# Patient Record
Sex: FEMALE | Race: WHITE | Employment: FULL TIME | URBAN - METROPOLITAN AREA
[De-identification: names, ages, dates, MRNs, and addresses within clinical notes are randomized per-mention and may not be internally consistent; named-entity substitution may affect disease eponyms.]

---

## 2018-03-24 ENCOUNTER — APPOINTMENT (OUTPATIENT)
Dept: GENERAL RADIOLOGY | Facility: CLINIC | Age: 23
End: 2018-03-24
Attending: EMERGENCY MEDICINE
Payer: COMMERCIAL

## 2018-03-24 ENCOUNTER — HOSPITAL ENCOUNTER (EMERGENCY)
Facility: CLINIC | Age: 23
Discharge: HOME | End: 2018-03-24
Attending: EMERGENCY MEDICINE | Admitting: EMERGENCY MEDICINE
Payer: COMMERCIAL

## 2018-03-24 VITALS
WEIGHT: 140 LBS | BODY MASS INDEX: 20.73 KG/M2 | HEIGHT: 69 IN | OXYGEN SATURATION: 97 % | DIASTOLIC BLOOD PRESSURE: 88 MMHG | RESPIRATION RATE: 27 BRPM | HEART RATE: 98 BPM | SYSTOLIC BLOOD PRESSURE: 139 MMHG

## 2018-03-24 DIAGNOSIS — T78.40XA ALLERGIC REACTION, INITIAL ENCOUNTER: ICD-10-CM

## 2018-03-24 DIAGNOSIS — R07.89 ATYPICAL CHEST PAIN: ICD-10-CM

## 2018-03-24 DIAGNOSIS — R06.02 SHORTNESS OF BREATH: ICD-10-CM

## 2018-03-24 LAB
ANION GAP SERPL CALCULATED.3IONS-SCNC: 9 MMOL/L (ref 3–14)
BASOPHILS # BLD AUTO: 0.1 10E9/L (ref 0–0.2)
BASOPHILS NFR BLD AUTO: 0.6 %
BUN SERPL-MCNC: 9 MG/DL (ref 7–30)
CALCIUM SERPL-MCNC: 9.3 MG/DL (ref 8.5–10.1)
CHLORIDE SERPL-SCNC: 103 MMOL/L (ref 94–109)
CO2 SERPL-SCNC: 24 MMOL/L (ref 20–32)
CREAT SERPL-MCNC: 0.64 MG/DL (ref 0.52–1.04)
DIFFERENTIAL METHOD BLD: NORMAL
EOSINOPHIL # BLD AUTO: 0.5 10E9/L (ref 0–0.7)
EOSINOPHIL NFR BLD AUTO: 5.3 %
ERYTHROCYTE [DISTWIDTH] IN BLOOD BY AUTOMATED COUNT: 12 % (ref 10–15)
GFR SERPL CREATININE-BSD FRML MDRD: >90 ML/MIN/1.7M2
GLUCOSE SERPL-MCNC: 94 MG/DL (ref 70–99)
HCT VFR BLD AUTO: 42.4 % (ref 35–47)
HGB BLD-MCNC: 14.7 G/DL (ref 11.7–15.7)
IMM GRANULOCYTES # BLD: 0 10E9/L (ref 0–0.4)
IMM GRANULOCYTES NFR BLD: 0.1 %
LYMPHOCYTES # BLD AUTO: 4.6 10E9/L (ref 0.8–5.3)
LYMPHOCYTES NFR BLD AUTO: 51.7 %
MCH RBC QN AUTO: 28.3 PG (ref 26.5–33)
MCHC RBC AUTO-ENTMCNC: 34.7 G/DL (ref 31.5–36.5)
MCV RBC AUTO: 82 FL (ref 78–100)
MONOCYTES # BLD AUTO: 0.6 10E9/L (ref 0–1.3)
MONOCYTES NFR BLD AUTO: 7.1 %
NEUTROPHILS # BLD AUTO: 3.2 10E9/L (ref 1.6–8.3)
NEUTROPHILS NFR BLD AUTO: 35.2 %
NRBC # BLD AUTO: 0 10*3/UL
NRBC BLD AUTO-RTO: 0 /100
PLATELET # BLD AUTO: 241 10E9/L (ref 150–450)
POTASSIUM SERPL-SCNC: 3.8 MMOL/L (ref 3.4–5.3)
RBC # BLD AUTO: 5.19 10E12/L (ref 3.8–5.2)
SODIUM SERPL-SCNC: 136 MMOL/L (ref 133–144)
TROPONIN I BLD-MCNC: 0 UG/L (ref 0–0.1)
WBC # BLD AUTO: 8.9 10E9/L (ref 4–11)

## 2018-03-24 PROCEDURE — 96361 HYDRATE IV INFUSION ADD-ON: CPT

## 2018-03-24 PROCEDURE — 84484 ASSAY OF TROPONIN QUANT: CPT

## 2018-03-24 PROCEDURE — 99284 EMERGENCY DEPT VISIT MOD MDM: CPT | Mod: 25

## 2018-03-24 PROCEDURE — 25000128 H RX IP 250 OP 636: Performed by: EMERGENCY MEDICINE

## 2018-03-24 PROCEDURE — 96374 THER/PROPH/DIAG INJ IV PUSH: CPT

## 2018-03-24 PROCEDURE — 25000132 ZZH RX MED GY IP 250 OP 250 PS 637: Performed by: EMERGENCY MEDICINE

## 2018-03-24 PROCEDURE — 80048 BASIC METABOLIC PNL TOTAL CA: CPT | Performed by: EMERGENCY MEDICINE

## 2018-03-24 PROCEDURE — 85025 COMPLETE CBC W/AUTO DIFF WBC: CPT | Performed by: EMERGENCY MEDICINE

## 2018-03-24 PROCEDURE — 71046 X-RAY EXAM CHEST 2 VIEWS: CPT

## 2018-03-24 RX ORDER — LIDOCAINE 40 MG/G
CREAM TOPICAL
Status: DISCONTINUED | OUTPATIENT
Start: 2018-03-24 | End: 2018-03-25 | Stop reason: HOSPADM

## 2018-03-24 RX ORDER — METHYLPREDNISOLONE SODIUM SUCCINATE 125 MG/2ML
125 INJECTION, POWDER, LYOPHILIZED, FOR SOLUTION INTRAMUSCULAR; INTRAVENOUS ONCE
Status: COMPLETED | OUTPATIENT
Start: 2018-03-24 | End: 2018-03-24

## 2018-03-24 RX ORDER — ALBUTEROL SULFATE 0.83 MG/ML
2.5 SOLUTION RESPIRATORY (INHALATION)
Status: DISCONTINUED | OUTPATIENT
Start: 2018-03-24 | End: 2018-03-25 | Stop reason: HOSPADM

## 2018-03-24 RX ORDER — PREDNISONE 20 MG/1
TABLET ORAL
Qty: 10 TABLET | Refills: 0 | Status: SHIPPED | OUTPATIENT
Start: 2018-03-24 | End: 2020-06-15

## 2018-03-24 RX ADMIN — RANITIDINE 150 MG: 150 TABLET ORAL at 19:21

## 2018-03-24 RX ADMIN — SODIUM CHLORIDE 1000 ML: 9 INJECTION, SOLUTION INTRAVENOUS at 19:23

## 2018-03-24 RX ADMIN — METHYLPREDNISOLONE SODIUM SUCCINATE 125 MG: 125 INJECTION, POWDER, FOR SOLUTION INTRAMUSCULAR; INTRAVENOUS at 19:22

## 2018-03-24 ASSESSMENT — ENCOUNTER SYMPTOMS
WHEEZING: 0
DIARRHEA: 0
VOICE CHANGE: 0
CHEST TIGHTNESS: 1
SORE THROAT: 0
VOMITING: 0
TROUBLE SWALLOWING: 0
COUGH: 0
NAUSEA: 0
SHORTNESS OF BREATH: 1

## 2018-03-24 NOTE — ED PROVIDER NOTES
"  History     Chief Complaint:  Allergic reaction    HPI   Jasvir Cueva is a 22 year old female with a history of asthma who presents to the ED for evaluation of an allergic reaction. The patient reports that she has had many allergic reactions in the past and is allergic to many things such as soy and anything from a seed, bean, or nut. The patient states that she ate a banana 1.5 hours ago and experienced immediate chest tightness and shortness of breath. She then took 4 benadryl 15 minutes after eating the banana which gave no relief. She denies any lip or tongue swelling, trouble swallowing, or rash.    Allergies:  Banana  Sulfa drugs  Soy     Medications:    Desogestrel-Ethinyl Estradiol  Albuterol inhaler    Past Medical History:    Uncomplicated asthma    Past Surgical History:    History reviewed. No pertinent surgical history.    Family History:    No other significant past medical history or family history.     Social History:  Smoking status: Never  Alcohol use: No  Marital Status: Single      Review of Systems   HENT: Negative for sore throat, trouble swallowing and voice change.    Respiratory: Positive for chest tightness and shortness of breath. Negative for cough and wheezing.    Cardiovascular: Negative for chest pain.   Gastrointestinal: Negative for diarrhea, nausea and vomiting.   Skin: Negative for rash.   All other systems reviewed and are negative.    Physical Exam   Patient Vitals for the past 24 hrs:   BP Pulse Heart Rate Resp SpO2 Height Weight   03/24/18 2015 - - 66 20 100 % - -   03/24/18 1858 139/88 98 98 26 98 % 1.753 m (5' 9\") 63.5 kg (140 lb)     Physical Exam  General: The patient is alert, in no respiratory distress.    HENT: Mucous membranes moist.    Cardiovascular: Regular rate and rhythm. Good pulses in all four extremities. Normal capillary refill and skin turgor.     Respiratory: Lungs are clear. No nasal flaring. No retractions. No wheezing, no " crackles.    Gastrointestinal: Abdomen soft. No guarding, no rebound. No palpable hernias.     Musculoskeletal: No gross deformity.     Skin: No rashes or petechiae.     Neurologic: The patient is alert and oriented x3. GCS 15. No testable cranial nerve deficit. Follows commands with clear and appropriate speech. Gives appropriate answers. Good strength in all extremities. No gross neurologic deficit. Gross sensation intact. Pupils are round and reactive. No meningismus.     Lymphatic: No cervical adenopathy. No lower extremity swelling.    Psychiatric: The patient is non-tearful.    Emergency Department Course     Imaging:  Radiographic findings were communicated with the patient who voiced understanding of the findings.    X-ray Chest, 2 views:  IMPRESSION: Hyperaeration. No other evidence of acute cardiopulmonary  disease is seen.  Result per radiology.     Laboratory:  1941 - Troponin: 0.00  CBC: WNL (WBC 8.9, HGB 14.7, )   BMP: WNL (Creatinine 0.64)    Interventions:  1921: Zantac 150 mg tablet PO  1922: Solu-medrol 125 mg IV injection  1923: NS 1L IV Bolus    Emergency Department Course:  Past medical records, nursing notes, and vitals reviewed.  1854: I performed an exam of the patient and obtained history, as documented above. GCS 15.    IV inserted and blood drawn.    The patient was sent for a x-ray while in the emergency department, findings above.    2019: I rechecked the patient. Explained findings to patient.    2148: I rechecked the patient. Findings and plan explained to the Patient. Patient discharged home with instructions regarding supportive care, medications, and reasons to return. The importance of close follow-up was reviewed.     Impression & Plan      Medical Decision Making:  The patient reports she has multiple food allergies and has had bananas previously, however, it is possible that something else caused this allergic reaction. I also considered with her chest tightness and chest  pain this could be ACS but that is very unlikely. With her screening EKG and chest x-ray it is not likely to be pneumonia or pneumothorax. The patient responded nicely to steroids. I did discuss giving her epinephrine here, however, she had already had a large dose of benadryl. She was treated with H2 blocker, steroid, observed here and improved. She was discharged home in improved pulmonary function, no signs of chest pain, and will follow up with her PCP. Steroids were started.    Diagnosis:    ICD-10-CM   1. Shortness of breath R06.02   2. Atypical chest pain R07.89   3. Allergic reaction, initial encounter T78.40XA       Disposition:  discharged to home    Discharge Medications:  New Prescriptions    PREDNISONE (DELTASONE) 20 MG TABLET    Take two tablets (= 40mg) each day for 5 (five) days         Ava Conrad  3/24/2018   [facility] EMERGENCY DEPARTMENT  I, Ava Conrad, am serving as a scribe at 6:54 PM on 3/24/2018 to document services personally performed by Rogelio Griffin MD based on my observations and the provider's statements to me.        Rogelio Griffin MD  03/24/18 8510

## 2018-03-24 NOTE — ED AVS SNAPSHOT
[facility] Emergency Department    [address]    [facility] [address]    Phone:  [phone]    Fax:  [phone]                                       Jasvir Cueva   MRN: [id number]    Department:  [facility] Emergency Department   Date of Visit:  3/24/2018           Patient Information     Date Of Birth          1995        Your diagnoses for this visit were:     Shortness of breath     Atypical chest pain     Allergic reaction, initial encounter        You were seen by Rogelio Griffin MD.      Follow-up Information     Follow up with Liz Carrera Schedule an appointment as soon as possible for a visit in 3 days.    Specialty:  Physician Assistant    Contact information:    [facility]  [address] [facility] [address]  [phone]          Discharge Instructions       Discharge Instructions  Allergic Reaction    An allergic reaction can result in a rash, itching, swelling, watery eyes, or a runny nose. A serious reaction can cause swelling of your mouth or throat, or wheezing. The most serious allergy is called analphylaxis, and can be life-threatening. Many allergies result in hives, also called urticaria.     An allergy happens when the body s natural defense system (immune system) overreacts to something harmless. The thing that triggers your allergic reaction is called an allergen. The first time you are exposed to your allergen, you may not have any reaction, but the body makes a protein called an antibody. The antibody lets the body recognize and remember the allergen.  Every time you are exposed to your allergen you get more antibody and your reaction can be more severe.      Call 911 if you have:    Swelling of the lips, tongue or throat.    Hoarse voice, drooling or trouble breathing.    Chest pain or shortness of breath.    Fainting or unconsciousness.    Return to the Emergency Department if:    You develop a  fever.    You have significant abdominal pain.    You vomit more than once.    Your rash changes or looks very different.    What can I do to help myself?    If you know what caused your allergy, don t touch it, throw any of it away, and tell others not to have it around you. Wear a medical alert bracelet with a name of your allergen on it.    If you don t know what you are allergic to, keep a journal of everything that you are exposed to (foods, soaps, medicines, etc.). Take this with you when you follow up with your primary doctor. This may help determine what is causing the allergic reaction.    Take any medicines that are prescribed.    Antihistamines can decrease rash or itching. You may use Benadryl  (diphenhydramine) for rash or itching according to package directions, or use a prescription antihistamine as recommended by your physician.    For significant allergic reactions, you may have been given an epinephrine (adrenaline) auto injector (EpiPen ). Carry this with you at all times! Use it if you are having any symptoms of anaphylaxis.  Do not be afraid to use it. Always call 911 if you use your EpiPen ! It is only meant to buy time until you can get to the Emergency Department!  If you were given a prescription for medicine here today, be sure to read all of the information (including the package insert) that comes with your prescription.  This will include important information about the medicine, its side effects, and any warnings that you need to know about.  The pharmacist who fills the prescription can provide more information and answer questions you may have about the medicine.  If you have questions or concerns that the pharmacist cannot address, please call or return to the Emergency Department.     Opioid Medication Information    Pain medications are among the most commonly prescribed medicines, so we are including this information for all our patients. If you did not receive pain medication or  get a prescription for pain medicine, you can ignore it.     You may have been given a prescription for an opioid (narcotic) pain medicine and/or have received a pain medicine while here in the Emergency Department. These medicines can make you drowsy or impaired. You must not drive, operate dangerous equipment, or engage in any other dangerous activities while taking these medications. If you drive while taking these medications, you could be arrested for DUI, or driving under the influence. Do not drink any alcohol while you are taking these medications.     Opioid pain medications can cause addiction. If you have a history of chemical dependency of any type, you are at a higher risk of becoming addicted to pain medications.  Only take these prescribed medications to treat your pain when all other options have been tried. Take it for as short a time and as few doses as possible. Store your pain pills in a secure place, as they are frequently stolen and provide a dangerous opportunity for children or visitors in your house to start abusing these powerful medications. We will not replace any lost or stolen medicine.  As soon as your pain is better, you should flush all your remaining medication.     Many prescription pain medications contain Tylenol  (acetaminophen), including Vicodin , Tylenol #3 , Norco , Lortab , and Percocet .  You should not take any extra pills of Tylenol  if you are using these prescription medications or you can get very sick.  Do not ever take more than 3000 mg of acetaminophen in any 24 hour period.    All opioids tend to cause constipation. Drink plenty of water and eat foods that have a lot of fiber, such as fruits, vegetables, prune juice, apple juice and high fiber cereal.  Take a laxative if you don t move your bowels at least every other day. Miralax , Milk of Magnesia, Colace , or Senna  can be used to keep you regular.      Remember that you can always come back to the Emergency  Department if you are not able to see your regular doctor in the amount of time listed above, if you get any new symptoms, or if there is anything that worries you.        24 Hour Appointment Hotline       To make an appointment at any [facility], call [phone] ([phone]). If you don't have a family doctor or clinic, we will help you find one. [place] clinics are conveniently located to serve the needs of you and your family.             Review of your medicines      START taking        Dose / Directions Last dose taken    predniSONE 20 MG tablet   Commonly known as:  DELTASONE   Quantity:  10 tablet        Take two tablets (= 40mg) each day for 5 (five) days   Refills:  0          Our records show that you are taking the medicines listed below. If these are incorrect, please call your family doctor or clinic.        Dose / Directions Last dose taken    * albuterol 108 (90 BASE) MCG/ACT Inhaler   Commonly known as:  PROAIR HFA/PROVENTIL HFA/VENTOLIN HFA        Inhale into the lungs every 6 hours   Refills:  0        * albuterol 108 (90 BASE) MCG/ACT Inhaler   Commonly known as:  PROAIR HFA   Dose:  2 puff   Quantity:  1 Inhaler        Inhale 2 puffs into the lungs every 4 hours as needed for shortness of breath / dyspnea   Refills:  0        desogestrel-ethinyl estradiol 0.1/0.125/0.15 -0.025 MG per tablet   Commonly known as:  SHELIA, VELIVET        Refills:  0        * Notice:  This list has 2 medication(s) that are the same as other medications prescribed for you. Read the directions carefully, and ask your doctor or other care provider to review them with you.            Prescriptions were sent or printed at these locations (1 Prescription)                   Other Prescriptions                Printed at Department/Unit printer (1 of 1)         predniSONE (DELTASONE) 20 MG tablet                Procedures and tests performed during your visit     Basic metabolic panel (BMP)    CBC + differential     Troponin POCT    XR Chest 2 Views      Orders Needing Specimen Collection     None      Pending Results     No orders found from 3/22/2018 to 3/25/2018.            Pending Culture Results     No orders found from 3/22/2018 to 3/25/2018.            Pending Results Instructions     If you had any lab results that were not finalized at the time of your Discharge, you can call the ED Lab Result RN at [phone]. You will be contacted by this team for any positive Lab results or changes in treatment. The nurses are available 7 days a week from 10A to 6:30P.  You can leave a message 24 hours per day and they will return your call.        Test Results From Your Hospital Stay        3/24/2018  9:39 PM      Narrative     CHEST TWO VIEWS  3/24/2018 7:32 PM     HISTORY: Shortness of breath.    COMPARISON: Chest x-rays dated 1/30/2015.    FINDINGS:  Hyperaeration is again noted. Lungs are otherwise clear.  Heart size, mediastinum, and pulmonary vascularity are within normal  limits. No pneumothorax or significant pleural fluid question. No  fracture.        Impression     IMPRESSION: Hyperaeration. No other evidence of acute cardiopulmonary  disease is seen.    KAYLEEN MORGAN MD         3/24/2018  7:27 PM      Component Results     Component Value Ref Range & Units Status    WBC 8.9 4.0 - 11.0 10e9/L Final    RBC Count 5.19 3.8 - 5.2 10e12/L Final    Hemoglobin 14.7 11.7 - 15.7 g/dL Final    Hematocrit 42.4 35.0 - 47.0 % Final    MCV 82 78 - 100 fl Final    MCH 28.3 26.5 - 33.0 pg Final    MCHC 34.7 31.5 - 36.5 g/dL Final    RDW 12.0 10.0 - 15.0 % Final    Platelet Count 241 150 - 450 10e9/L Final    Diff Method Automated Method  Final    % Neutrophils 35.2 % Final    % Lymphocytes 51.7 % Final    % Monocytes 7.1 % Final    % Eosinophils 5.3 % Final    % Basophils 0.6 % Final    % Immature Granulocytes 0.1 % Final    Nucleated RBCs 0 0 /100 Final    Absolute Neutrophil 3.2 1.6 - 8.3 10e9/L Final    Absolute Lymphocytes 4.6  0.8 - 5.3 10e9/L Final    Absolute Monocytes 0.6 0.0 - 1.3 10e9/L Final    Absolute Eosinophils 0.5 0.0 - 0.7 10e9/L Final    Absolute Basophils 0.1 0.0 - 0.2 10e9/L Final    Abs Immature Granulocytes 0.0 0 - 0.4 10e9/L Final    Absolute Nucleated RBC 0.0  Final         3/24/2018  8:39 PM      Component Results     Component Value Ref Range & Units Status    Sodium 136 133 - 144 mmol/L Final    Potassium 3.8 3.4 - 5.3 mmol/L Final    Chloride 103 94 - 109 mmol/L Final    Carbon Dioxide 24 20 - 32 mmol/L Final    Anion Gap 9 3 - 14 mmol/L Final    Glucose 94 70 - 99 mg/dL Final    Urea Nitrogen 9 7 - 30 mg/dL Final    Creatinine 0.64 0.52 - 1.04 mg/dL Final    GFR Estimate >90 >60 mL/min/1.7m2 Final    Non  GFR Calc    GFR Estimate If Black >90 >60 mL/min/1.7m2 Final    African American GFR Calc    Calcium 9.3 8.5 - 10.1 mg/dL Final         3/24/2018  7:54 PM      Component Results     Component Value Ref Range & Units Status    Troponin I 0.00 0.00 - 0.10 ug/L Final                Clinical Quality Measure: Blood Pressure Screening     Your blood pressure was checked while you were in the emergency department today. The last reading we obtained was  BP: 139/88 . Please read the guidelines below about what these numbers mean and what you should do about them.  If your systolic blood pressure (the top number) is less than 120 and your diastolic blood pressure (the bottom number) is less than 80, then your blood pressure is normal. There is nothing more that you need to do about it.  If your systolic blood pressure (the top number) is 120-139 or your diastolic blood pressure (the bottom number) is 80-89, your blood pressure may be higher than it should be. You should have your blood pressure rechecked within a year by a primary care provider.  If your systolic blood pressure (the top number) is 140 or greater or your diastolic blood pressure (the bottom number) is 90 or greater, you may have high blood  "pressure. High blood pressure is treatable, but if left untreated over time it can put you at risk for heart attack, stroke, or kidney failure. You should have your blood pressure rechecked by a primary care provider within the next 4 weeks.  If your provider in the emergency department today gave you specific instructions to follow-up with your doctor or provider even sooner than that, you should follow that instruction and not wait for up to 4 weeks for your follow-up visit.        Thank you for choosing [facility]       Thank you for choosing [facility] for your care. Our goal is always to provide you with excellent care. Hearing back from our patients is one way we can continue to improve our services. Please take a few minutes to complete the written survey that you may receive in the mail after you visit with us. Thank you!        [facility] Information     [facility] lets you send messages to your doctor, view your test results, renew your prescriptions, schedule appointments and more. To sign up, go to [email or web address] . Click on \"Log in\" on the left side of the screen, which will take you to the Welcome page. Then click on \"Sign up Now\" on the right side of the page.     You will be asked to enter the access code listed below, as well as some personal information. Please follow the directions to create your username and password.     Your access code is: [email or web address]  Expires: 2018  9:53 PM     Your access code will  in 90 days. If you need help or a new code, please call your [facility] clinic or [phone].        Care EveryWhere ID     This is your Care EveryWhere ID. This could be used by other organizations to access your [facility] medical records  [id number]        Equal Access to Services     KATHIA CERVANTES : gemma More, anna sibley. So [facility] [phone].    ATENCIÓN: Si habla español, tiene a bernardo " disposición servicios gratuitos de asistencia lingüística. Kimber al [phone].    We comply with applicable federal civil rights laws and [place] laws. We do not discriminate on the basis of race, color, national origin, age, disability, sex, sexual orientation, or gender identity.            After Visit Summary       This is your record. Keep this with you and show to your community pharmacist(s) and doctor(s) at your next visit.

## 2018-03-24 NOTE — ED NOTES
ABCs intact. Pt ate a banana and occasionally will have a reaction. Pt c/o chest tightness after eating. Pt took 4 benadryl about an hr pta.

## 2018-03-24 NOTE — ED AVS SNAPSHOT
[facility] Emergency Department    [address]    [facility] [address]    Phone:  [phone]    Fax:  [phone]                                       Jasvir Cueva   MRN: [id number]    Department:  [facility] Emergency Department   Date of Visit:  3/24/2018           After Visit Summary Signature Page     I have received my discharge instructions, and my questions have been answered. I have discussed any challenges I see with this plan with the nurse or doctor.    ..........................................................................................................................................  Patient/Patient Representative Signature      ..........................................................................................................................................  Patient Representative Print Name and Relationship to Patient    ..................................................               ................................................  Date                                            Time    ..........................................................................................................................................  Reviewed by Signature/Title    ...................................................              ..............................................  Date                                                            Time

## 2018-03-25 NOTE — DISCHARGE INSTRUCTIONS
Discharge Instructions  Allergic Reaction    An allergic reaction can result in a rash, itching, swelling, watery eyes, or a runny nose. A serious reaction can cause swelling of your mouth or throat, or wheezing. The most serious allergy is called analphylaxis, and can be life-threatening. Many allergies result in hives, also called urticaria.     An allergy happens when the body s natural defense system (immune system) overreacts to something harmless. The thing that triggers your allergic reaction is called an allergen. The first time you are exposed to your allergen, you may not have any reaction, but the body makes a protein called an antibody. The antibody lets the body recognize and remember the allergen.  Every time you are exposed to your allergen you get more antibody and your reaction can be more severe.      Call 911 if you have:    Swelling of the lips, tongue or throat.    Hoarse voice, drooling or trouble breathing.    Chest pain or shortness of breath.    Fainting or unconsciousness.    Return to the Emergency Department if:    You develop a fever.    You have significant abdominal pain.    You vomit more than once.    Your rash changes or looks very different.    What can I do to help myself?    If you know what caused your allergy, don t touch it, throw any of it away, and tell others not to have it around you. Wear a medical alert bracelet with a name of your allergen on it.    If you don t know what you are allergic to, keep a journal of everything that you are exposed to (foods, soaps, medicines, etc.). Take this with you when you follow up with your primary doctor. This may help determine what is causing the allergic reaction.    Take any medicines that are prescribed.    Antihistamines can decrease rash or itching. You may use Benadryl  (diphenhydramine) for rash or itching according to package directions, or use a prescription antihistamine as recommended by your physician.    For significant  RNCC Encounter Summary    Clinical Documentation:  Patient offers no complaints at this time.  She did have some questions about using her spacer with her albuterol inhaler.  She was able to demonstrate use of this without concern.  She also feels that her rescue inhaler is effective when she is using it.  RNCC will call again in one month.  Jens     RNCC Assessments    See Care Coordination Smartform for complete baseline and current assessment.       Current Signs/Symptoms     Have you had any changes in your Health since we spoke last? No  Symptoms present:    Respiratory   [] Shortness of breath at rest   [] Shortness of breath with Activity   [] Orthopnea   [] Cough   [] Wheezing   [] Increased Sputum    Activity   [] Increased Fatigue   [] Weakness   [] Dizziness   [] ADL impairment    [] Exercise / Physical Activity   [] New Fear of Falling or Recent Fall   General   [] Chills    [] Confusion   [] Visual Changes   [] Numbness   [] Tingling   [] Nausea   [] Vomiting    [] Constipation   [] Diarrhea   [] Urinary Concern   [] Appetite Concern   [] Acute Weight Concern   [] Sleep Concern   [] Wound   [] Inadequate support        Symptom Trends:   COPD Symptom Course   Stable    Activity Status  Stable  Other known Chronic Condition Symptom Course  No change     Utilization Since last  contact:   None     Medication:  New RX since Last Contact: No   New Medication Concerns (See Patient Level Medication section for baseline adherence assessment): Yes    Acute  Pain Assessment    Acute Pain:  No       Goals Addressed     None        RNCC Interventions                Faciliated Medication Adjustment   Patient Education:  Yes   Recipients of Education:  Other Support   Education Methods used this encounter:  Verbal, Teach-Back   Action Plan:  Yes   Daily Maintenance   Decision Making support, Disease Process, Medication Education, Exercise, S/Sx Infection, Wellness/Lifestyle   Reinforce Care Plan        Acuity  Assessment     Acuity Assessment:   Date Acuity Level   10/25/17 1              allergic reactions, you may have been given an epinephrine (adrenaline) auto injector (EpiPen ). Carry this with you at all times! Use it if you are having any symptoms of anaphylaxis.  Do not be afraid to use it. Always call 911 if you use your EpiPen ! It is only meant to buy time until you can get to the Emergency Department!  If you were given a prescription for medicine here today, be sure to read all of the information (including the package insert) that comes with your prescription.  This will include important information about the medicine, its side effects, and any warnings that you need to know about.  The pharmacist who fills the prescription can provide more information and answer questions you may have about the medicine.  If you have questions or concerns that the pharmacist cannot address, please call or return to the Emergency Department.     Opioid Medication Information    Pain medications are among the most commonly prescribed medicines, so we are including this information for all our patients. If you did not receive pain medication or get a prescription for pain medicine, you can ignore it.     You may have been given a prescription for an opioid (narcotic) pain medicine and/or have received a pain medicine while here in the Emergency Department. These medicines can make you drowsy or impaired. You must not drive, operate dangerous equipment, or engage in any other dangerous activities while taking these medications. If you drive while taking these medications, you could be arrested for DUI, or driving under the influence. Do not drink any alcohol while you are taking these medications.     Opioid pain medications can cause addiction. If you have a history of chemical dependency of any type, you are at a higher risk of becoming addicted to pain medications.  Only take these prescribed medications to treat your pain when all other options have been tried. Take it for as short a time and as few  doses as possible. Store your pain pills in a secure place, as they are frequently stolen and provide a dangerous opportunity for children or visitors in your house to start abusing these powerful medications. We will not replace any lost or stolen medicine.  As soon as your pain is better, you should flush all your remaining medication.     Many prescription pain medications contain Tylenol  (acetaminophen), including Vicodin , Tylenol #3 , Norco , Lortab , and Percocet .  You should not take any extra pills of Tylenol  if you are using these prescription medications or you can get very sick.  Do not ever take more than 3000 mg of acetaminophen in any 24 hour period.    All opioids tend to cause constipation. Drink plenty of water and eat foods that have a lot of fiber, such as fruits, vegetables, prune juice, apple juice and high fiber cereal.  Take a laxative if you don t move your bowels at least every other day. Miralax , Milk of Magnesia, Colace , or Senna  can be used to keep you regular.      Remember that you can always come back to the Emergency Department if you are not able to see your regular doctor in the amount of time listed above, if you get any new symptoms, or if there is anything that worries you.

## 2020-06-07 ENCOUNTER — APPOINTMENT (OUTPATIENT)
Dept: GENERAL RADIOLOGY | Facility: CLINIC | Age: 25
End: 2020-06-07
Attending: PHYSICIAN ASSISTANT
Payer: COMMERCIAL

## 2020-06-07 ENCOUNTER — HOSPITAL ENCOUNTER (EMERGENCY)
Facility: CLINIC | Age: 25
Discharge: HOME | End: 2020-06-07
Attending: PHYSICIAN ASSISTANT | Admitting: PHYSICIAN ASSISTANT
Payer: COMMERCIAL

## 2020-06-07 ENCOUNTER — APPOINTMENT (OUTPATIENT)
Dept: CT IMAGING | Facility: CLINIC | Age: 25
End: 2020-06-07
Attending: PHYSICIAN ASSISTANT
Payer: COMMERCIAL

## 2020-06-07 VITALS
TEMPERATURE: 98.2 F | RESPIRATION RATE: 16 BRPM | HEART RATE: 82 BPM | OXYGEN SATURATION: 98 % | DIASTOLIC BLOOD PRESSURE: 82 MMHG | SYSTOLIC BLOOD PRESSURE: 126 MMHG

## 2020-06-07 DIAGNOSIS — S70.11XA CONTUSION OF RIGHT HIP AND THIGH, INITIAL ENCOUNTER: ICD-10-CM

## 2020-06-07 DIAGNOSIS — M54.50 ACUTE RIGHT-SIDED LOW BACK PAIN WITHOUT SCIATICA: ICD-10-CM

## 2020-06-07 DIAGNOSIS — S70.01XA CONTUSION OF RIGHT HIP AND THIGH, INITIAL ENCOUNTER: ICD-10-CM

## 2020-06-07 DIAGNOSIS — S06.0X0A CONCUSSION WITHOUT LOSS OF CONSCIOUSNESS, INITIAL ENCOUNTER: ICD-10-CM

## 2020-06-07 DIAGNOSIS — S46.911A STRAIN OF RIGHT SHOULDER, INITIAL ENCOUNTER: ICD-10-CM

## 2020-06-07 LAB — HCG UR QL: NEGATIVE

## 2020-06-07 PROCEDURE — 70450 CT HEAD/BRAIN W/O DYE: CPT

## 2020-06-07 PROCEDURE — 99285 EMERGENCY DEPT VISIT HI MDM: CPT | Mod: 25

## 2020-06-07 PROCEDURE — 73030 X-RAY EXAM OF SHOULDER: CPT | Mod: RT

## 2020-06-07 PROCEDURE — 25000132 ZZH RX MED GY IP 250 OP 250 PS 637: Performed by: PHYSICIAN ASSISTANT

## 2020-06-07 PROCEDURE — 73502 X-RAY EXAM HIP UNI 2-3 VIEWS: CPT

## 2020-06-07 PROCEDURE — 81025 URINE PREGNANCY TEST: CPT | Performed by: PHYSICIAN ASSISTANT

## 2020-06-07 PROCEDURE — 72100 X-RAY EXAM L-S SPINE 2/3 VWS: CPT

## 2020-06-07 RX ORDER — LIDOCAINE 4 G/G
1 PATCH TOPICAL ONCE
Status: DISCONTINUED | OUTPATIENT
Start: 2020-06-07 | End: 2020-06-07 | Stop reason: HOSPADM

## 2020-06-07 RX ORDER — LIDOCAINE 50 MG/G
1 PATCH TOPICAL EVERY 24 HOURS
Qty: 10 PATCH | Refills: 0 | Status: SHIPPED | OUTPATIENT
Start: 2020-06-07 | End: 2020-06-17

## 2020-06-07 RX ORDER — IBUPROFEN 600 MG/1
600 TABLET, FILM COATED ORAL ONCE
Status: DISCONTINUED | OUTPATIENT
Start: 2020-06-07 | End: 2020-06-07 | Stop reason: HOSPADM

## 2020-06-07 RX ORDER — CYCLOBENZAPRINE HCL 10 MG
10 TABLET ORAL 3 TIMES DAILY PRN
Qty: 15 TABLET | Refills: 0 | Status: SHIPPED | OUTPATIENT
Start: 2020-06-07 | End: 2020-06-15

## 2020-06-07 RX ORDER — CYCLOBENZAPRINE HCL 10 MG
10 TABLET ORAL ONCE
Status: COMPLETED | OUTPATIENT
Start: 2020-06-07 | End: 2020-06-07

## 2020-06-07 RX ADMIN — CYCLOBENZAPRINE HYDROCHLORIDE 10 MG: 10 TABLET, FILM COATED ORAL at 15:43

## 2020-06-07 RX ADMIN — LIDOCAINE 1 PATCH: 560 PATCH PERCUTANEOUS; TOPICAL; TRANSDERMAL at 15:43

## 2020-06-07 ASSESSMENT — ENCOUNTER SYMPTOMS
VOMITING: 1
HEADACHES: 0
CONFUSION: 1
NECK PAIN: 0
BACK PAIN: 1
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
## 2020-06-07 NOTE — DISCHARGE INSTRUCTIONS
Use Ibuprofen/Tylenol and ice/heat for pain.   Take Flexeril if back continues to hurt.   Use Lidoderm patches.       Discharge Instructions  Concussion    You were seen today for signs of a concussion.  The symptoms will vary, depending on the nature of your injury and your health. You may have: headache, confusion, nausea (feel sick to your stomach), vomiting (throwing up) and problems with memory, concentrating, or sleep. You may feel dizzy, irritable, and tired. Children and teens may need help from their parents, teachers, and coaches to watch for symptoms as they recover.    Generally, every Emergency Department visit should have a follow-up clinic visit with either a primary or a specialty clinic/provider. Please follow-up as instructed by your emergency provider today.     Return to the Emergency Department if:  Your headache gets worse or you start to have a really bad headache even with the recommended treatment plan.   You feel drowsier, have growing confusion, or slurred speech.   You keep repeating yourself.   You have strange behavior or are feeling more irritable.   You have a seizure.   You vomit (throw up) more than once.   You have trouble walking.   You have weakness or numbness.  Your neck pain gets worse.   You have a loss of consciousness.   You have blood for fluid coming from your ears or nose.   You have new symptoms or anything that worries you.     Home Care:  Get lots of rest and get enough sleep at night. Take daytime naps or rest if you feel tired.   Limit physical activity and  thinking  activities. These can make symptoms worse.   Physical activities include gym, sports, weight training, running, exercise, and heavy lifting.   Thinking activities include homework, class work, job-related work, and screen time (phone, computer, tablet, TV, and video games).   Stick to a healthy diet and drink lots of fluids. Avoid alcohol.  As symptoms improve, you may slowly return to your daily  activities. If symptoms get worse or return, reduce your activity.   Know that it is normal to feel sad or frustrated when you do not feel right and are less active.     Going Back to Work:  Your care team will tell you when you are ready to return to work.    Limit the amount of work you do soon after your injury. This may speed healing. Take breaks if your symptoms get worse. You should also reduce your physical activity as well as activities that require a lot of thinking until you see your doctor. You may need shorter work days and a lighter workload.  Avoid heavy lifting, working with machinery, driving and working at heights until your symptoms are gone or you are cleared by a provider.    Going Back to School:  If you are still having symptoms, you may need extra help at school.  Tell your teachers and school nurse about your injury and symptoms. Ask them to watch for problems with learning, memory, and concentrating. Symptoms may get worse when you do schoolwork, and you may become more irritable. You may need shorter school days, a reduced workload, and to postpone testing.  Do not drive or take gym class (physical activity) until cleared by a provider.    Returning to Sports:  Never return to play if you have any symptoms. A full recovery will reduce the chances of getting hurt again. Remember, it is better to miss one or two games than a whole season.  You should rest from all physical activity until you see your provider. Generally, if all symptoms have completely cleared, your provider can help guide you to slowly return to sports. If symptoms return or worsen, stop the activity and see your provider.  Important: If you are in an organized sport and under age 18, you will need written consent from a healthcare provider before you return to sports. Typically, this will be your primary care or sports medicine provider. Please make an appointment.    If you were given a prescription for medicine here  today, be sure to read all of the information (including the package insert) that comes with your prescription.  This will include important information about the medicine, its side effects, and any warnings that you need to know about.  The pharmacist who fills the prescription can provide more information and answer questions you may have about the medicine.  If you have questions or concerns that the pharmacist cannot address, please call or return to the Emergency Department.     Remember that you can always come back to the Emergency Department if you are not able to see your regular provider in the amount of time listed above, if you get any new symptoms, or if there is anything that worries you.

## 2020-06-07 NOTE — ED AVS SNAPSHOT
[facility] Emergency Department  [address]  [facility] [address]  Phone:  [phone]  Fax:  [phone]                                    Jasvir Cueva   MRN: [id number]    Department:  [facility] Emergency Department   Date of Visit:  6/7/2020           After Visit Summary Signature Page    I have received my discharge instructions, and my questions have been answered. I have discussed any challenges I see with this plan with the nurse or doctor.    ..........................................................................................................................................  Patient/Patient Representative Signature      ..........................................................................................................................................  Patient Representative Print Name and Relationship to Patient    ..................................................               ................................................  Date                                   Time    ..........................................................................................................................................  Reviewed by Signature/Title    ...................................................              ..............................................  Date                                               Time          22EPIC Rev 08/18
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
## 2020-06-15 ENCOUNTER — OFFICE VISIT (OUTPATIENT)
Dept: URGENT CARE | Facility: URGENT CARE | Age: 25
End: 2020-06-15
Payer: COMMERCIAL

## 2020-06-15 VITALS
DIASTOLIC BLOOD PRESSURE: 67 MMHG | HEART RATE: 74 BPM | OXYGEN SATURATION: 99 % | TEMPERATURE: 97.9 F | WEIGHT: 150 LBS | BODY MASS INDEX: 22.15 KG/M2 | SYSTOLIC BLOOD PRESSURE: 119 MMHG
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
## 2020-06-15 DIAGNOSIS — S61.259A DOG BITE OF FINGER, INITIAL ENCOUNTER: Primary | ICD-10-CM

## 2020-06-15 PROCEDURE — 99203 OFFICE O/P NEW LOW 30 MIN: CPT | Performed by: PHYSICIAN ASSISTANT

## 2020-06-15 NOTE — PROGRESS NOTES
SUBJECTIVE:  Jasvir Cueva is a 25 year old female who presents with a chief complaint of an animal bite on the right 4th finger .  She was bitten by a dog, unknown animal yesterday.   Taking a walk with a friend and her friend asked if could pet a mike 2 dogs and did with no issue  Patient just moved her hand and was bit by the dog.    .  Severity: mild, bite with skin break.  Animal's immunizations up to date   Associated symptoms: tenderness and swelling at site.  No bleeding, redness or bruising noted. Hurts to fully bend finger     TD 2019     Past Medical History:   Diagnosis Date     Uncomplicated asthma        Current Outpatient Medications:      albuterol (ALBUTEROL) 108 (90 BASE) MCG/ACT inhaler, Inhale 2 puffs into the lungs every 4 hours as needed for shortness of breath / dyspnea, Disp: 1 Inhaler, Rfl: 0     albuterol (PROAIR HFA, PROVENTIL HFA, VENTOLIN HFA) 108 (90 BASE) MCG/ACT inhaler, Inhale into the lungs every 6 hours, Disp: , Rfl:      lidocaine (LIDODERM) 5 % patch, Place 1 patch onto the skin every 24 hours for 10 days, Disp: 10 patch, Rfl: 0  Social History     Tobacco Use     Smoking status: Never Smoker     Smokeless tobacco: Never Used   Substance Use Topics     Alcohol use: No       ROS:  Review of systems negative except as stated above.    OBJECTIVE:  /67   Pulse 74   Temp 97.9  F (36.6  C) (Tympanic)   Wt 68 kg (150 lb)   SpO2 99%   BMI 22.15 kg/m    GENERAL: healthy, alert no acute distress  SKIN:  Right 4th finger with superficial bite angelina.  No bleeding  No redness noted. Non tender.  Tendon function intact.    MS:extremities normal- no gross deformities noted,  FROM noted in all extremities  NEURO: Normal strength and tone, sensory exam grossly normal,  normal speech and mentation    assessment/plan:  (S61.259A,  W54.0XXA) Dog bite of finger, initial encounter  (primary encounter diagnosis)  Comment:   Plan: amoxicillin-clavulanate (AUGMENTIN) 875-125 MG          tablet          Finger soaked and cleaned and bandaged. TD current. Will start on Augmentin prevention.  Signs of infection discussed and to Follow-up with PCP as needed